# Patient Record
Sex: MALE | Race: WHITE | NOT HISPANIC OR LATINO
[De-identification: names, ages, dates, MRNs, and addresses within clinical notes are randomized per-mention and may not be internally consistent; named-entity substitution may affect disease eponyms.]

---

## 2019-09-11 ENCOUNTER — APPOINTMENT (OUTPATIENT)
Dept: ORTHOPEDIC SURGERY | Facility: CLINIC | Age: 54
End: 2019-09-11

## 2019-09-11 PROBLEM — Z00.00 ENCOUNTER FOR PREVENTIVE HEALTH EXAMINATION: Status: ACTIVE | Noted: 2019-09-11

## 2020-03-04 ENCOUNTER — APPOINTMENT (OUTPATIENT)
Dept: ORTHOPEDIC SURGERY | Facility: CLINIC | Age: 55
End: 2020-03-04
Payer: COMMERCIAL

## 2020-03-04 VITALS — WEIGHT: 192 LBS | BODY MASS INDEX: 22.67 KG/M2 | HEIGHT: 77 IN

## 2020-03-04 DIAGNOSIS — Z78.9 OTHER SPECIFIED HEALTH STATUS: ICD-10-CM

## 2020-03-04 DIAGNOSIS — M25.622 STIFFNESS OF LEFT ELBOW, NOT ELSEWHERE CLASSIFIED: ICD-10-CM

## 2020-03-04 PROCEDURE — 99204 OFFICE O/P NEW MOD 45 MIN: CPT

## 2020-03-04 PROCEDURE — 73070 X-RAY EXAM OF ELBOW: CPT | Mod: LT

## 2020-03-04 NOTE — HISTORY OF PRESENT ILLNESS
[de-identified] : Location: left elbow\par Quality: aching\par Duration: 1.5 year\par Context: atraumatic\par Aggravating Factors: ROM , bending, pressure \par Conservative treatment:  Rest, otc\par Associated Symptoms: Stiffness\par Prior Studies: n.a\par

## 2020-03-04 NOTE — DISCUSSION/SUMMARY
[de-identified] : Options for treatment discussed. He is to put ice on it. He'll do some physical therapy. There is no improvement he will call and we will do an MRI of his left elbow. Follow up as needed

## 2020-03-04 NOTE — PHYSICAL EXAM
[de-identified] : Left elbow x-ray shows no evidence of acute fracture or dislocation though there does appear to be some small loose bodies anteriorly. [de-identified] : Left elbow shows no warmth or swelling there is tenderness over the lateral epicondyle as well as over the triceps. Some pain with resisted extension of the wrist and digits as well as his triceps use. He does lack about 10° of flexion compared to the other side. Extension is normal and rotation is normal. Neurovascular exam is normal